# Patient Record
Sex: FEMALE | Race: WHITE | NOT HISPANIC OR LATINO | URBAN - METROPOLITAN AREA
[De-identification: names, ages, dates, MRNs, and addresses within clinical notes are randomized per-mention and may not be internally consistent; named-entity substitution may affect disease eponyms.]

---

## 2019-03-29 ENCOUNTER — RECORDS - HEALTHEAST (OUTPATIENT)
Dept: LAB | Facility: CLINIC | Age: 84
End: 2019-03-29

## 2019-03-29 LAB
ALT SERPL W P-5'-P-CCNC: 11 U/L (ref 0–45)
ANION GAP SERPL CALCULATED.3IONS-SCNC: 8 MMOL/L (ref 5–18)
BUN SERPL-MCNC: 20 MG/DL (ref 8–28)
CALCIUM SERPL-MCNC: 9.7 MG/DL (ref 8.5–10.5)
CHLORIDE BLD-SCNC: 102 MMOL/L (ref 98–107)
CHOLEST SERPL-MCNC: 167 MG/DL
CO2 SERPL-SCNC: 30 MMOL/L (ref 22–31)
CREAT SERPL-MCNC: 0.75 MG/DL (ref 0.6–1.1)
FASTING STATUS PATIENT QL REPORTED: ABNORMAL
GFR SERPL CREATININE-BSD FRML MDRD: >60 ML/MIN/1.73M2
GLUCOSE BLD-MCNC: 86 MG/DL (ref 70–125)
HDLC SERPL-MCNC: 45 MG/DL
HGB BLD-MCNC: 13 G/DL (ref 12–16)
LDLC SERPL CALC-MCNC: 96 MG/DL
POTASSIUM BLD-SCNC: 3.5 MMOL/L (ref 3.5–5)
SODIUM SERPL-SCNC: 140 MMOL/L (ref 136–145)
TRIGL SERPL-MCNC: 129 MG/DL

## 2019-09-28 ENCOUNTER — RECORDS - HEALTHEAST (OUTPATIENT)
Dept: LAB | Facility: CLINIC | Age: 84
End: 2019-09-28

## 2019-09-30 LAB
ANION GAP SERPL CALCULATED.3IONS-SCNC: 8 MMOL/L (ref 5–18)
BUN SERPL-MCNC: 18 MG/DL (ref 8–28)
CALCIUM SERPL-MCNC: 9.8 MG/DL (ref 8.5–10.5)
CHLORIDE BLD-SCNC: 103 MMOL/L (ref 98–107)
CO2 SERPL-SCNC: 29 MMOL/L (ref 22–31)
CREAT SERPL-MCNC: 0.7 MG/DL (ref 0.6–1.1)
GFR SERPL CREATININE-BSD FRML MDRD: >60 ML/MIN/1.73M2
GLUCOSE BLD-MCNC: 91 MG/DL (ref 70–125)
POTASSIUM BLD-SCNC: 3.5 MMOL/L (ref 3.5–5)
SODIUM SERPL-SCNC: 140 MMOL/L (ref 136–145)

## 2020-06-26 ENCOUNTER — RECORDS - HEALTHEAST (OUTPATIENT)
Dept: LAB | Facility: CLINIC | Age: 85
End: 2020-06-26

## 2020-06-26 LAB
ANION GAP SERPL CALCULATED.3IONS-SCNC: 10 MMOL/L (ref 5–18)
BASOPHILS # BLD AUTO: 0 THOU/UL (ref 0–0.2)
BASOPHILS NFR BLD AUTO: 0 % (ref 0–2)
BUN SERPL-MCNC: 31 MG/DL (ref 8–28)
CALCIUM SERPL-MCNC: 9.3 MG/DL (ref 8.5–10.5)
CHLORIDE BLD-SCNC: 104 MMOL/L (ref 98–107)
CO2 SERPL-SCNC: 27 MMOL/L (ref 22–31)
CREAT SERPL-MCNC: 0.86 MG/DL (ref 0.6–1.1)
EOSINOPHIL # BLD AUTO: 0.2 THOU/UL (ref 0–0.4)
EOSINOPHIL NFR BLD AUTO: 3 % (ref 0–6)
ERYTHROCYTE [DISTWIDTH] IN BLOOD BY AUTOMATED COUNT: 13.2 % (ref 11–14.5)
GFR SERPL CREATININE-BSD FRML MDRD: >60 ML/MIN/1.73M2
GLUCOSE BLD-MCNC: 76 MG/DL (ref 70–125)
HCT VFR BLD AUTO: 40 % (ref 35–47)
HGB BLD-MCNC: 12.6 G/DL (ref 12–16)
LYMPHOCYTES # BLD AUTO: 2.9 THOU/UL (ref 0.8–4.4)
LYMPHOCYTES NFR BLD AUTO: 38 % (ref 20–40)
MCH RBC QN AUTO: 30.1 PG (ref 27–34)
MCHC RBC AUTO-ENTMCNC: 31.5 G/DL (ref 32–36)
MCV RBC AUTO: 96 FL (ref 80–100)
MONOCYTES # BLD AUTO: 0.5 THOU/UL (ref 0–0.9)
MONOCYTES NFR BLD AUTO: 7 % (ref 2–10)
NEUTROPHILS # BLD AUTO: 3.9 THOU/UL (ref 2–7.7)
NEUTROPHILS NFR BLD AUTO: 52 % (ref 50–70)
PLATELET # BLD AUTO: 232 THOU/UL (ref 140–440)
PMV BLD AUTO: 12.9 FL (ref 8.5–12.5)
POTASSIUM BLD-SCNC: 3.6 MMOL/L (ref 3.5–5)
RBC # BLD AUTO: 4.19 MILL/UL (ref 3.8–5.4)
SODIUM SERPL-SCNC: 141 MMOL/L (ref 136–145)
WBC: 7.6 THOU/UL (ref 4–11)

## 2020-09-02 ENCOUNTER — AMBULATORY - HEALTHEAST (OUTPATIENT)
Dept: GERIATRICS | Facility: CLINIC | Age: 85
End: 2020-09-02

## 2020-09-03 ENCOUNTER — AMBULATORY - HEALTHEAST (OUTPATIENT)
Dept: ADMINISTRATIVE | Facility: CLINIC | Age: 85
End: 2020-09-03

## 2020-09-03 RX ORDER — DONEPEZIL HYDROCHLORIDE 5 MG/1
5 TABLET, FILM COATED ORAL AT BEDTIME
Status: SHIPPED | COMMUNITY
Start: 2020-09-03

## 2020-09-03 RX ORDER — LIDOCAINE 4 G/G
1 PATCH TOPICAL EVERY 24 HOURS
Status: SHIPPED | COMMUNITY
Start: 2020-09-03

## 2020-09-03 RX ORDER — ATORVASTATIN CALCIUM 10 MG/1
10 TABLET, FILM COATED ORAL AT BEDTIME
Status: SHIPPED | COMMUNITY
Start: 2020-09-03

## 2020-09-03 RX ORDER — METOPROLOL SUCCINATE 50 MG/1
50 TABLET, EXTENDED RELEASE ORAL 2 TIMES DAILY
Status: SHIPPED | COMMUNITY
Start: 2020-09-03

## 2020-09-03 RX ORDER — MEMANTINE HYDROCHLORIDE 10 MG/1
10 TABLET ORAL 2 TIMES DAILY
Status: SHIPPED | COMMUNITY
Start: 2020-09-03

## 2020-09-03 RX ORDER — HYDROCHLOROTHIAZIDE 25 MG/1
25 TABLET ORAL DAILY
Status: SHIPPED | COMMUNITY
Start: 2020-09-03

## 2020-09-03 RX ORDER — MIRTAZAPINE 15 MG/1
15 TABLET, FILM COATED ORAL AT BEDTIME
Status: SHIPPED | COMMUNITY
Start: 2020-09-03

## 2020-09-03 RX ORDER — LISINOPRIL 30 MG/1
30 TABLET ORAL DAILY
Status: SHIPPED | COMMUNITY
Start: 2020-09-03

## 2020-09-03 RX ORDER — ACETAMINOPHEN 500 MG
1000 TABLET ORAL AT BEDTIME
Status: SHIPPED | COMMUNITY
Start: 2020-09-03

## 2020-09-09 ENCOUNTER — OFFICE VISIT - HEALTHEAST (OUTPATIENT)
Dept: GERIATRICS | Facility: CLINIC | Age: 85
End: 2020-09-09

## 2020-09-09 DIAGNOSIS — F02.80 LATE ONSET ALZHEIMER'S DISEASE WITHOUT BEHAVIORAL DISTURBANCE (H): ICD-10-CM

## 2020-09-09 DIAGNOSIS — I10 ESSENTIAL HYPERTENSION: ICD-10-CM

## 2020-09-09 DIAGNOSIS — U07.1 INFECTION DUE TO 2019 NOVEL CORONAVIRUS: ICD-10-CM

## 2020-09-09 DIAGNOSIS — E78.5 HYPERLIPIDEMIA, UNSPECIFIED HYPERLIPIDEMIA TYPE: ICD-10-CM

## 2020-09-09 DIAGNOSIS — G30.1 LATE ONSET ALZHEIMER'S DISEASE WITHOUT BEHAVIORAL DISTURBANCE (H): ICD-10-CM

## 2020-09-10 ENCOUNTER — OFFICE VISIT - HEALTHEAST (OUTPATIENT)
Dept: GERIATRICS | Facility: CLINIC | Age: 85
End: 2020-09-10

## 2020-09-10 DIAGNOSIS — F01.50 VASCULAR DEMENTIA WITHOUT BEHAVIORAL DISTURBANCE (H): ICD-10-CM

## 2020-09-10 DIAGNOSIS — Z20.822 EXPOSURE TO COVID-19 VIRUS: ICD-10-CM

## 2020-09-10 DIAGNOSIS — I10 ESSENTIAL HYPERTENSION: ICD-10-CM

## 2020-09-10 DIAGNOSIS — G89.29 CHRONIC MIDLINE LOW BACK PAIN, UNSPECIFIED WHETHER SCIATICA PRESENT: ICD-10-CM

## 2020-09-10 DIAGNOSIS — M54.50 CHRONIC MIDLINE LOW BACK PAIN, UNSPECIFIED WHETHER SCIATICA PRESENT: ICD-10-CM

## 2020-09-10 DIAGNOSIS — E55.9 VITAMIN D DEFICIENCY: ICD-10-CM

## 2020-09-11 ENCOUNTER — AMBULATORY - HEALTHEAST (OUTPATIENT)
Dept: GERIATRICS | Facility: CLINIC | Age: 85
End: 2020-09-11

## 2021-06-04 VITALS
RESPIRATION RATE: 16 BRPM | OXYGEN SATURATION: 96 % | HEART RATE: 69 BPM | WEIGHT: 138 LBS | DIASTOLIC BLOOD PRESSURE: 58 MMHG | SYSTOLIC BLOOD PRESSURE: 110 MMHG | TEMPERATURE: 97 F

## 2021-06-11 NOTE — PROGRESS NOTES
[facility] Admission note      Patient: Violetta Kenney  MRN: [id number]  Date of Service: 9/9/2020      [facility] [[id number]]  Reason for Visit     Chief Complaint   Patient presents with     H & P       Code Status     DNR/DNI    Assessment     -Acute COVId infection  -Underlying history of severe dementia  -History of TIAs and CVA  -History of urinary incontinence  --History of chronic lymphedema bilateral lower extremities  -History of hypertension  -History of hyperlipidemia  -History of osteoporosis    Plan     Patient examined using a video encounter  Informed consent of the patient taken prior to the start of the video encounter  [facility]  Location of patient is [facility]  Start time 1 PM  end time 1.30pm  Patient is a resident of assisted living facility was brought into the TCU because of acute covid  infection  She was brought in from the assisted living and is being monitored in isolation unit.  Examination was  done with assistance of nursing who provided with most of the answers due to underlying history of profound dementia.  She has at present minimal symptoms with at best low-grade temp and profound fatigue being the only symptom she has no diarrhea.  No URI symptoms cough congestion or respiratory difficulty noted on exam either.  She is being monitored closely and the plan is to send her back to her apartment if she remains asymptomatic.  She is also being monitored for any hypoxia concerns but has none so far  Overall the plan is to keep her comfortable with symptomatic treatment only.  She has been declining recently with low intake.  Her primary care notes were reviewed and apparently they were planning to discontinue her supplements and hydrochlorothiazide due to decreasing weights and intake.  However it appears that she was not taken of these medications and she continues on those meds for now monitor her  intake weights and blood pressures.  Discontinue these meds if she continues to decline  She has been started on Remeron for management of mood and appetite loss  Discharge plan is to go back to [facility] as soon as she is able to as she is asymptomatic    History     Patient is a very pleasant 84 y.o. female who is admitted to TCU  Patient is a resident of assisted living facility where she has tested positive for COVID 19  Since then she has been moved out of her [facility] unit into the TCU COVID isolation unit where she is being monitored closely for any symptoms.  Has some fatigue at baseline but other than that denies any cough congestion or shortness of breath no diarrhea reported either  She has underlying history of profound dementia and remains a poor historian secondary to that  However overall mood and behaviors have been stable and she is pleasantly confused  She has also history of hypertension and remains compliant with her medications.  Blood pressures have been stable in the TCU  She also has a history of chronic lymphedema with congestive heart failure recently due to declining intake and progressive dementia that they have taken her off several of her medications including her diuretic.  Her HCTZ and supplements including calcium and multivitamin were discontinued and it was felt that she may be a candidate for hospice if she continues to decline she has elected to be DNR/DNI in the TCU        Past Medical History       Past Medical History:   Diagnosis Date     Breast cancer (H)      Chronic rhinitis      Dementia (H)      Essential hypertension      Gastric ulcer      Lacunar infarction (H)      Lymphedema of lower extremity      Mixed hyperlipidemia      Osteoporosis      Pulmonary nodule      Thyroid cancer (H)      Thyroid nodule      Urinary incontinence        Past Social History     Reviewed, and she  reports that she has never smoked. She has never used smokeless tobacco. She reports previous alcohol  use. She reports that she does not use drugs.       Family History     Reviewed, and family history includes Breast cancer in her sister and another family member.    Medication List   Post Discharge Medication Reconciliation Status: discharge medications reconciled, continue medications without change   Current Outpatient Medications on File Prior to Visit   Medication Sig Dispense Refill     acetaminophen (TYLENOL) 500 MG tablet Take 1,000 mg by mouth every 6 (six) hours as needed for pain.       aspirin 81 MG EC tablet Take 81 mg by mouth daily.       atorvastatin (LIPITOR) 10 MG tablet Take 10 mg by mouth at bedtime.       cholecalciferol, vitamin D3, 50 mcg (2,000 unit) Tab Take 1 tablet by mouth daily.       donepeziL (ARICEPT) 5 MG tablet Take 5 mg by mouth at bedtime.       fluticasone propionate (FLONASE) 50 mcg/actuation nasal spray Apply 2 sprays into each nostril daily.       hydroCHLOROthiazide (HYDRODIURIL) 25 MG tablet Take 25 mg by mouth daily.       lidocaine 4 % patch Place 1 patch on the skin daily. Remove and discard patch with 12 hours or as directed by MD.       lisinopriL (PRINIVIL,ZESTRIL) 30 MG tablet Take 30 mg by mouth daily.       memantine (NAMENDA) 10 MG tablet Take 10 mg by mouth 2 (two) times a day.       metoprolol succinate (TOPROL-XL) 50 MG 24 hr tablet Take 50 mg by mouth daily.       mirtazapine (REMERON) 15 MG tablet Take 15 mg by mouth at bedtime.       tuberculin 5 tub. unit /0.1 mL injection Inject 5 Units into the skin once.       No current facility-administered medications on file prior to visit.        Allergies     Allergies   Allergen Reactions     Codeine      Iodine        Review of Systems   A comprehensive review of 14 systems was done. Pertinent findings noted here and in history of present illness. All the rest negative.  Constitutional: Negative.  Negative for fever, chills, she has activity change, appetite change and fatigue.   HENT: Negative for congestion and  facial swelling.    Eyes: Negative for photophobia, redness and visual disturbance.   Respiratory: Negative for cough and chest tightness.    Cardiovascular: Negative for chest pain, palpitations and leg swelling.   Gastrointestinal: Negative for nausea, diarrhea, constipation, blood in stool and abdominal distention.   Genitourinary: Negative.    Musculoskeletal: Negative.  Weaker with difficulty ambulate  Skin: Negative.    Neurological: Negative for dizziness, tremors, syncope, weakness, light-headedness and headaches.   Hematological: Does not bruise/bleed easily.   Psychiatric/Behavioral: Negative.  Recall is impaired      Physical Exam     Blood pressure 138/72 temp 98 pulse 80 weight 139 pounds    Constitutional: Oriented to person,  and appears well-developed.  Patient is pleasantly confused with minimal recall  GENERAL: no acute distress. Cooperative in conversation.   HEENT: pupils are equal, round and reactive. Oral mucosa is moist and intact.  RESP:Chest symmetric. Regular respiratory rate. No stridor.  ABD: Nondistended, soft.  EXTREMITIES: has lower extremity edema.   NEURO: non focal. Alert and oriented x self  PSYCH: within normal limits. No depression or anxiety.  SKIN: warm dry intact           Lab Results     Recent Results (from the past 240 hour(s))   COVID-19 Virus PCR MRF    Collection Time: 08/30/20  8:55 PM    Specimen: Respiratory   Result Value Ref Range    COVID-19 VIRUS SPECIMEN SOURCE Nasopharyngeal     2019-nCOV Detected, Abnormal Result (!!)    COVID-19 Virus PCR MRF    Collection Time: 09/01/20  6:52 PM    Specimen: Respiratory   Result Value Ref Range    COVID-19 VIRUS SPECIMEN SOURCE Nasopharyngeal     2019-nCOV Not Detected                 CASSIE Quispe

## 2021-06-16 PROBLEM — Z20.822 EXPOSURE TO COVID-19 VIRUS: Status: ACTIVE | Noted: 2020-09-10

## 2021-06-16 PROBLEM — F01.50 VASCULAR DEMENTIA WITHOUT BEHAVIORAL DISTURBANCE (H): Status: ACTIVE | Noted: 2020-09-10

## 2021-06-16 PROBLEM — M54.50 CHRONIC MIDLINE LOW BACK PAIN: Status: ACTIVE | Noted: 2020-09-10

## 2021-06-16 PROBLEM — G89.29 CHRONIC MIDLINE LOW BACK PAIN: Status: ACTIVE | Noted: 2020-09-10

## 2021-06-16 PROBLEM — I10 ESSENTIAL HYPERTENSION: Status: ACTIVE | Noted: 2020-09-10

## 2021-06-16 PROBLEM — E55.9 VITAMIN D DEFICIENCY: Status: ACTIVE | Noted: 2020-09-10

## 2021-06-20 NOTE — LETTER
Letter by Letty Grossman MBBS at      Author: Letty Grossman MBBS Service: -- Author Type: --    Filed:  Encounter Date: 9/9/2020 Status: (Other)         Patient: Violetta Kenney   MR Number: [id number]   YOB: 1935   Date of Visit: 9/9/2020       [facility] Admission note      Patient: Violetta Kenney  MRN: [id number]  Date of Service: 9/9/2020      [facility] [[id number]]  Reason for Visit     Chief Complaint   Patient presents with   ? H & P       Code Status     DNR/DNI    Assessment     -Acute COVId infection  -Underlying history of severe dementia  -History of TIAs and CVA  -History of urinary incontinence  --History of chronic lymphedema bilateral lower extremities  -History of hypertension  -History of hyperlipidemia  -History of osteoporosis    Plan     Patient examined using a video encounter  Informed consent of the patient taken prior to the start of the video encounter  Location of MD is medical [facility] for [facility]  Location of patient is [facility]  Start time 1 PM  end time 1.30pm  Patient is a resident of assisted living facility was brought into the TCU because of acute covid  infection  She was brought in from the assisted living and is being monitored in isolation unit.  Examination was  done with assistance of nursing who provided with most of the answers due to underlying history of profound dementia.  She has at present minimal symptoms with at best low-grade temp and profound fatigue being the only symptom she has no diarrhea.  No URI symptoms cough congestion or respiratory difficulty noted on exam either.  She is being monitored closely and the plan is to send her back to her apartment if she remains asymptomatic.  She is also being monitored for any hypoxia concerns but has none so far  Overall the plan is to keep her comfortable with symptomatic treatment only.  She has been declining recently with low intake.  Her primary care notes  were reviewed and apparently they were planning to discontinue her supplements and hydrochlorothiazide due to decreasing weights and intake.  However it appears that she was not taken of these medications and she continues on those meds for now monitor her intake weights and blood pressures.  Discontinue these meds if she continues to decline  She has been started on Remeron for management of mood and appetite loss  Discharge plan is to go back to [facility] as soon as she is able to as she is asymptomatic    History     Patient is a very pleasant 84 y.o. female who is admitted to TCU  Patient is a resident of assisted living facility where she has tested positive for COVID 19  Since then she has been moved out of her [facility] unit into the TCU COVID isolation unit where she is being monitored closely for any symptoms.  Has some fatigue at baseline but other than that denies any cough congestion or shortness of breath no diarrhea reported either  She has underlying history of profound dementia and remains a poor historian secondary to that  However overall mood and behaviors have been stable and she is pleasantly confused  She has also history of hypertension and remains compliant with her medications.  Blood pressures have been stable in the TCU  She also has a history of chronic lymphedema with congestive heart failure recently due to declining intake and progressive dementia that they have taken her off several of her medications including her diuretic.  Her HCTZ and supplements including calcium and multivitamin were discontinued and it was felt that she may be a candidate for hospice if she continues to decline she has elected to be DNR/DNI in the TCU        Past Medical History       Past Medical History:   Diagnosis Date   ? Breast cancer (H)    ? Chronic rhinitis    ? Dementia (H)    ? Essential hypertension    ? Gastric ulcer    ? Lacunar infarction (H)    ? Lymphedema of lower extremity    ? Mixed hyperlipidemia    ?  Osteoporosis    ? Pulmonary nodule    ? Thyroid cancer (H)    ? Thyroid nodule    ? Urinary incontinence        Past Social History     Reviewed, and she  reports that she has never smoked. She has never used smokeless tobacco. She reports previous alcohol use. She reports that she does not use drugs.       Family History     Reviewed, and family history includes Breast cancer in her sister and another family member.    Medication List   Post Discharge Medication Reconciliation Status: discharge medications reconciled, continue medications without change   Current Outpatient Medications on File Prior to Visit   Medication Sig Dispense Refill   ? acetaminophen (TYLENOL) 500 MG tablet Take 1,000 mg by mouth every 6 (six) hours as needed for pain.     ? aspirin 81 MG EC tablet Take 81 mg by mouth daily.     ? atorvastatin (LIPITOR) 10 MG tablet Take 10 mg by mouth at bedtime.     ? cholecalciferol, vitamin D3, 50 mcg (2,000 unit) Tab Take 1 tablet by mouth daily.     ? donepeziL (ARICEPT) 5 MG tablet Take 5 mg by mouth at bedtime.     ? fluticasone propionate (FLONASE) 50 mcg/actuation nasal spray Apply 2 sprays into each nostril daily.     ? hydroCHLOROthiazide (HYDRODIURIL) 25 MG tablet Take 25 mg by mouth daily.     ? lidocaine 4 % patch Place 1 patch on the skin daily. Remove and discard patch with 12 hours or as directed by MD.     ? lisinopriL (PRINIVIL,ZESTRIL) 30 MG tablet Take 30 mg by mouth daily.     ? memantine (NAMENDA) 10 MG tablet Take 10 mg by mouth 2 (two) times a day.     ? metoprolol succinate (TOPROL-XL) 50 MG 24 hr tablet Take 50 mg by mouth daily.     ? mirtazapine (REMERON) 15 MG tablet Take 15 mg by mouth at bedtime.     ? tuberculin 5 tub. unit /0.1 mL injection Inject 5 Units into the skin once.       No current facility-administered medications on file prior to visit.        Allergies     Allergies   Allergen Reactions   ? Codeine    ? Iodine        Review of Systems   A comprehensive review  of 14 systems was done. Pertinent findings noted here and in history of present illness. All the rest negative.  Constitutional: Negative.  Negative for fever, chills, she has activity change, appetite change and fatigue.   HENT: Negative for congestion and facial swelling.    Eyes: Negative for photophobia, redness and visual disturbance.   Respiratory: Negative for cough and chest tightness.    Cardiovascular: Negative for chest pain, palpitations and leg swelling.   Gastrointestinal: Negative for nausea, diarrhea, constipation, blood in stool and abdominal distention.   Genitourinary: Negative.    Musculoskeletal: Negative.  Weaker with difficulty ambulate  Skin: Negative.    Neurological: Negative for dizziness, tremors, syncope, weakness, light-headedness and headaches.   Hematological: Does not bruise/bleed easily.   Psychiatric/Behavioral: Negative.  Recall is impaired      Physical Exam     Blood pressure 138/72 temp 98 pulse 80 weight 139 pounds    Constitutional: Oriented to person,  and appears well-developed.  Patient is pleasantly confused with minimal recall  GENERAL: no acute distress. Cooperative in conversation.   HEENT: pupils are equal, round and reactive. Oral mucosa is moist and intact.  RESP:Chest symmetric. Regular respiratory rate. No stridor.  ABD: Nondistended, soft.  EXTREMITIES: has lower extremity edema.   NEURO: non focal. Alert and oriented x self  PSYCH: within normal limits. No depression or anxiety.  SKIN: warm dry intact           Lab Results     Recent Results (from the past 240 hour(s))   COVID-19 Virus PCR MRF    Collection Time: 08/30/20  8:55 PM    Specimen: Respiratory   Result Value Ref Range    COVID-19 VIRUS SPECIMEN SOURCE Nasopharyngeal     2019-nCOV Detected, Abnormal Result (!!)    COVID-19 Virus PCR MRF    Collection Time: 09/01/20  6:52 PM    Specimen: Respiratory   Result Value Ref Range    COVID-19 VIRUS SPECIMEN SOURCE Nasopharyngeal     2019-nCOV Not Detected                  CASSIE Quispe

## 2021-06-20 NOTE — LETTER
"Letter by Bunny Martell NP at      Author: Bunny Martell NP Service: -- Author Type: --    Filed:  Encounter Date: 9/10/2020 Status: (Other)         Patient: Violetta Kenney   MR Number: [id number]   YOB: 1935   Date of Visit: 9/10/2020     [facility]   Video Visit    Code Status: POLST AVAILABLE    Violetta Kenney is a 84 y.o. female who is being evaluated via a billable video visit.      The patient has been notified of following:     \"This video visit will be conducted via a call between you and your physician/provider. We have found that certain health care needs can be provided without the need for an in-person physical exam.  This service lets us provide the care you need with a video conversation.  If a prescription is necessary we can send it to the facility team.  If lab work is needed we can place an order through the facility team to have that test done at a later time.    If during the course of the call the physician/provider feels a video visit is not appropriate, you will not be charged for this service.\"     Physician/provider has received verbal consent for a Video Visit from the patient? Yes    Patient would like the video invitation sent by: Send to:  NA    Video Start Time: 0925    Chief Complaint/Reason for Visit:  Chief Complaint   Patient presents with   ? Discharge Summary       HPI:   Violetta is a 84 y.o. female who lives in the [facility] and was transferred from there on 9/1/20. She has a PMH of vascular dementia, urinary incontinence, lymphedema, TIAs, hx of malignant neoplasm of breast, osteoporosis, htn who tested positive for covid 19 on 9/1/20 so was sent to [facility]. She was asymptomatic. She will be returning to the [facility] on 9/12/20 with services.    I have reviewed and updated the patient's Past Medical History, Social History, Family History and Medication List.    ALLERGIES  Codeine and Iodine    Review of Systems "   Constitutional: Positive for activity change and fatigue. Negative for appetite change, chills and diaphoresis.   HENT: Positive for hearing loss. Negative for congestion.    Eyes: Negative.    Respiratory: Negative for shortness of breath.    Gastrointestinal: Negative.    Endocrine: Negative.    Genitourinary: Negative for dysuria.   Musculoskeletal:        Denies pain   Skin:        intact   Allergic/Immunologic: Negative.    Neurological: Negative for dizziness, speech difficulty and light-headedness.   Hematological: Negative.    Psychiatric/Behavioral: Positive for confusion.       Vitals:    09/10/20 1711   BP: 110/58   Pulse: 69   Resp: 16   Temp: 97  F (36.1  C)   SpO2: 96%   Weight: 138 lb (62.6 kg)         MEDICATION LIST:  Current Outpatient Medications   Medication Sig   ? acetaminophen (TYLENOL) 500 MG tablet Take 1,000 mg by mouth at bedtime.    ? aspirin 81 MG EC tablet Take 81 mg by mouth daily.   ? atorvastatin (LIPITOR) 10 MG tablet Take 10 mg by mouth at bedtime.   ? cholecalciferol, vitamin D3, 50 mcg (2,000 unit) Tab Take 1 tablet by mouth daily.   ? donepeziL (ARICEPT) 5 MG tablet Take 5 mg by mouth at bedtime.   ? fluticasone propionate (FLONASE) 50 mcg/actuation nasal spray Apply 2 sprays into each nostril daily.   ? hydroCHLOROthiazide (HYDRODIURIL) 25 MG tablet Take 25 mg by mouth daily.   ? lidocaine 4 % patch Place 1 patch on the skin daily. Remove and discard patch with 12 hours or as directed by MD.   ? lisinopriL (PRINIVIL,ZESTRIL) 30 MG tablet Take 30 mg by mouth daily.   ? memantine (NAMENDA) 10 MG tablet Take 10 mg by mouth 2 (two) times a day.   ? metoprolol succinate (TOPROL-XL) 50 MG 24 hr tablet Take 50 mg by mouth 2 (two) times a day.    ? mirtazapine (REMERON) 15 MG tablet Take 15 mg by mouth at bedtime.   ? tuberculin 5 tub. unit /0.1 mL injection Inject 5 Units into the skin once.       Labs:  Results for orders placed or performed in visit on 06/26/20   Basic Metabolic  Panel   Result Value Ref Range    Sodium 141 136 - 145 mmol/L    Potassium 3.6 3.5 - 5.0 mmol/L    Chloride 104 98 - 107 mmol/L    CO2 27 22 - 31 mmol/L    Anion Gap, Calculation 10 5 - 18 mmol/L    Glucose 76 70 - 125 mg/dL    Calcium 9.3 8.5 - 10.5 mg/dL    BUN 31 (H) 8 - 28 mg/dL    Creatinine 0.86 0.60 - 1.10 mg/dL    GFR MDRD Af Amer >60 >60 mL/min/1.73m2    GFR MDRD Non Af Amer >60 >60 mL/min/1.73m2     Lab Results   Component Value Date    WBC 7.6 06/26/2020    HGB 12.6 06/26/2020    HCT 40.0 06/26/2020    MCV 96 06/26/2020     06/26/2020       Additional provider notes:     Assessment/Plan:    Covid 19: tested positive on 8/30/20, asymptomatic    Back pain: continue lidocaine patch and tylenol 1000mg at HS    Htn: continue lisinopril 30mg daily, HCTZ 25mg daily and metoprolol 50mg BID    Dementia: continue donepezil 5mg at HS and namenda 10mg daily    Failure to thrive: continue remeron 15mg at HS    CAD: continue ASA 81mg daily and statin    Vit D def: continue D3 2000U daily    Disposition: Plans to return to the [facility] on 9/12/20 with services.    MEDICAL EQUIPMENT NEEDS:  NA    DISCHARGE PLAN/FACE TO FACE:  I certify that services are/were furnished while this patient was under the care of a physician and that a physician or an allowed non-physician practitioner (NPP), had a face-to-face encounter that meets the physician face-to-face encounter requirements. The encounter was in whole, or in part, related to the primary reason for home health. The patient is confined to his/her home and needs intermittent skilled nursing, physical therapy, speech-language pathology, or the continued need for occupational therapy. A plan of care has been established by a physician and is periodically reviewed by a physician.  Date of Video Encounter: 9/10/20      I certify that, based on my findings, the following services are medically necessary home health services: [facility] PT/OT to evaluate and treat    My clinical  findings support the need for the above skilled services because: patient will need assistance with performing IADLs and ADLs effectively and safely    The patient is, or has been, under my care and I have initiated the establishment of the plan of care. This patient will be followed by a physician who will periodically review the plan of care.    Schedule follow up visit with primary care provider within 7 days to reestablish care.    PCP: System, Provider Not In   Phone: None   Fax: [phone]    Video-Visit Details    Type of service:  Video Visit    Video End Time (time video stopped): 0935    Originating Location (pt. Location):[facility] [[id number]]    Distant Location (provider location):  [facility]     Mode of Communication:  Face Time Video Conference    The care plan has been reviewed and all orders signed. Changes to care plan, if any, as noted. Otherwise, continue care plan of care.  The total time spent with this patient/staff was 31 minutes, with greater than 50% spent in counseling and coordination of care that included multiple issues per covid 19, therapy and discharge, which lasted 17 minutes.      Bunny Martell NP      Attestation signed by Letty Grossman MBBS at 9/10/2020  9:45 PM:  Agree with dc note

## 2021-06-29 NOTE — PROGRESS NOTES
"Progress Notes by Bunny Martell NP at 9/10/2020  8:41 AM     Author: Bunny Martell NP Service: -- Author Type: Nurse Practitioner    Filed: 9/10/2020  6:30 PM Encounter Date: 9/10/2020 Status: Attested    : Bunny Martell NP (Nurse Practitioner) Cosigner: Letty Grossman MBBS at 9/10/2020  9:45 PM    Attestation signed by Letty Grossman MBBS at 9/10/2020  9:45 PM    Agree with dc note                [facility]   Video Visit    Code Status: POLST AVAILABLE    Violetta Kenney is a 84 y.o. female who is being evaluated via a billable video visit.      The patient has been notified of following:     \"This video visit will be conducted via a call between you and your physician/provider. We have found that certain health care needs can be provided without the need for an in-person physical exam.  This service lets us provide the care you need with a video conversation.  If a prescription is necessary we can send it to the facility team.  If lab work is needed we can place an order through the facility team to have that test done at a later time.    If during the course of the call the physician/provider feels a video visit is not appropriate, you will not be charged for this service.\"     Physician/provider has received verbal consent for a Video Visit from the patient? Yes    Patient would like the video invitation sent by: Send to:  NA    Video Start Time: 0925    Chief Complaint/Reason for Visit:  Chief Complaint   Patient presents with   ? Discharge Summary       HPI:   Violetta is a 84 y.o. female who lives in the \[facility]\"" and was transferred from there on 9/1/20. She has a PMH of vascular dementia, urinary incontinence, lymphedema, TIAs, hx of malignant neoplasm of breast, osteoporosis, htn who tested positive for covid 19 on 9/1/20 so was sent to [facility]. She was asymptomatic. She will be returning to the \[facility]\"" on 9/12/20 with services.    I have reviewed and updated the " patient's Past Medical History, Social History, Family History and Medication List.    ALLERGIES  Codeine and Iodine    Review of Systems   Constitutional: Positive for activity change and fatigue. Negative for appetite change, chills and diaphoresis.   HENT: Positive for hearing loss. Negative for congestion.    Eyes: Negative.    Respiratory: Negative for shortness of breath.    Gastrointestinal: Negative.    Endocrine: Negative.    Genitourinary: Negative for dysuria.   Musculoskeletal:        Denies pain   Skin:        intact   Allergic/Immunologic: Negative.    Neurological: Negative for dizziness, speech difficulty and light-headedness.   Hematological: Negative.    Psychiatric/Behavioral: Positive for confusion.       Vitals:    09/10/20 1711   BP: 110/58   Pulse: 69   Resp: 16   Temp: 97  F (36.1  C)   SpO2: 96%   Weight: 138 lb (62.6 kg)         MEDICATION LIST:  Current Outpatient Medications   Medication Sig   ? acetaminophen (TYLENOL) 500 MG tablet Take 1,000 mg by mouth at bedtime.    ? aspirin 81 MG EC tablet Take 81 mg by mouth daily.   ? atorvastatin (LIPITOR) 10 MG tablet Take 10 mg by mouth at bedtime.   ? cholecalciferol, vitamin D3, 50 mcg (2,000 unit) Tab Take 1 tablet by mouth daily.   ? donepeziL (ARICEPT) 5 MG tablet Take 5 mg by mouth at bedtime.   ? fluticasone propionate (FLONASE) 50 mcg/actuation nasal spray Apply 2 sprays into each nostril daily.   ? hydroCHLOROthiazide (HYDRODIURIL) 25 MG tablet Take 25 mg by mouth daily.   ? lidocaine 4 % patch Place 1 patch on the skin daily. Remove and discard patch with 12 hours or as directed by MD.   ? lisinopriL (PRINIVIL,ZESTRIL) 30 MG tablet Take 30 mg by mouth daily.   ? memantine (NAMENDA) 10 MG tablet Take 10 mg by mouth 2 (two) times a day.   ? metoprolol succinate (TOPROL-XL) 50 MG 24 hr tablet Take 50 mg by mouth 2 (two) times a day.    ? mirtazapine (REMERON) 15 MG tablet Take 15 mg by mouth at bedtime.   ? tuberculin 5 tub. unit /0.1 mL  injection Inject 5 Units into the skin once.       Labs:  Results for orders placed or performed in visit on 06/26/20   Basic Metabolic Panel   Result Value Ref Range    Sodium 141 136 - 145 mmol/L    Potassium 3.6 3.5 - 5.0 mmol/L    Chloride 104 98 - 107 mmol/L    CO2 27 22 - 31 mmol/L    Anion Gap, Calculation 10 5 - 18 mmol/L    Glucose 76 70 - 125 mg/dL    Calcium 9.3 8.5 - 10.5 mg/dL    BUN 31 (H) 8 - 28 mg/dL    Creatinine 0.86 0.60 - 1.10 mg/dL    GFR MDRD Af Amer >60 >60 mL/min/1.73m2    GFR MDRD Non Af Amer >60 >60 mL/min/1.73m2     Lab Results   Component Value Date    WBC 7.6 06/26/2020    HGB 12.6 06/26/2020    HCT 40.0 06/26/2020    MCV 96 06/26/2020     06/26/2020       Additional provider notes:     Assessment/Plan:    Covid 19: tested positive on 8/30/20, asymptomatic    Back pain: continue lidocaine patch and tylenol 1000mg at HS    Htn: continue lisinopril 30mg daily, HCTZ 25mg daily and metoprolol 50mg BID    Dementia: continue donepezil 5mg at HS and namenda 10mg daily    Failure to thrive: continue remeron 15mg at HS    CAD: continue ASA 81mg daily and statin    Vit D def: continue D3 2000U daily    Disposition: Plans to return to the [facility] on 9/12/20 with services.    MEDICAL EQUIPMENT NEEDS:  NA    DISCHARGE PLAN/FACE TO FACE:  I certify that services are/were furnished while this patient was under the care of a physician and that a physician or an allowed non-physician practitioner (NPP), had a face-to-face encounter that meets the physician face-to-face encounter requirements. The encounter was in whole, or in part, related to the primary reason for home health. The patient is confined to his/her home and needs intermittent skilled nursing, physical therapy, speech-language pathology, or the continued need for occupational therapy. A plan of care has been established by a physician and is periodically reviewed by a physician.  Date of Video Encounter: 9/10/20      I certify that,  based on my findings, the following services are medically necessary home health services: [facility] PT/OT to evaluate and treat    My clinical findings support the need for the above skilled services because: patient will need assistance with performing IADLs and ADLs effectively and safely    The patient is, or has been, under my care and I have initiated the establishment of the plan of care. This patient will be followed by a physician who will periodically review the plan of care.    Schedule follow up visit with primary care provider within 7 days to reestablish care.    PCP: System, Provider Not In   Phone: None   Fax: [phone]    Video-Visit Details    Type of service:  Video Visit    Video End Time (time video stopped): 0935    Originating Location (pt. Location):[facility] [[id number]]    Distant Location (provider location):  [facility]     Mode of Communication:  Face Time Video Conference    The care plan has been reviewed and all orders signed. Changes to care plan, if any, as noted. Otherwise, continue care plan of care.  The total time spent with this patient/staff was 31 minutes, with greater than 50% spent in counseling and coordination of care that included multiple issues per covid 19, therapy and discharge, which lasted 17 minutes.      Bunny Martell NP